# Patient Record
Sex: FEMALE | Race: WHITE | HISPANIC OR LATINO | Employment: OTHER | URBAN - METROPOLITAN AREA
[De-identification: names, ages, dates, MRNs, and addresses within clinical notes are randomized per-mention and may not be internally consistent; named-entity substitution may affect disease eponyms.]

---

## 2023-04-18 DIAGNOSIS — E03.9 ACQUIRED HYPOTHYROIDISM: Primary | ICD-10-CM

## 2023-04-18 RX ORDER — LEVOTHYROXINE SODIUM 100 UG/1
100 TABLET ORAL DAILY
Qty: 90 TABLET | Refills: 0 | Status: SHIPPED | OUTPATIENT
Start: 2023-04-18 | End: 2023-06-28 | Stop reason: SDUPTHER

## 2023-04-18 RX ORDER — LEVOTHYROXINE SODIUM 100 UG/1
100 TABLET ORAL DAILY
COMMUNITY
Start: 2015-10-27 | End: 2023-04-18 | Stop reason: SDUPTHER

## 2023-06-14 ENCOUNTER — APPOINTMENT (OUTPATIENT)
Dept: PRIMARY CARE | Facility: CLINIC | Age: 68
End: 2023-06-14
Payer: MEDICARE

## 2023-06-19 PROBLEM — E03.9 ACQUIRED HYPOTHYROIDISM: Status: ACTIVE | Noted: 2023-06-19

## 2023-06-19 PROBLEM — M75.20 BICEPS TENDINITIS: Status: ACTIVE | Noted: 2023-06-19

## 2023-06-19 PROBLEM — R21 RASH: Status: ACTIVE | Noted: 2023-06-19

## 2023-06-19 PROBLEM — Z86.010 HISTORY OF COLON POLYPS: Status: ACTIVE | Noted: 2023-06-19

## 2023-06-19 PROBLEM — N20.0 CALCULUS OF KIDNEY: Status: ACTIVE | Noted: 2023-06-19

## 2023-06-19 PROBLEM — E78.5 HYPERLIPIDEMIA: Status: ACTIVE | Noted: 2023-06-19

## 2023-06-19 PROBLEM — E66.9 OBESITY (BMI 30-39.9): Status: ACTIVE | Noted: 2023-06-19

## 2023-06-19 PROBLEM — M47.812 CERVICAL OSTEOARTHRITIS: Status: ACTIVE | Noted: 2023-06-19

## 2023-06-19 PROBLEM — Z86.0100 HISTORY OF COLON POLYPS: Status: ACTIVE | Noted: 2023-06-19

## 2023-06-19 PROBLEM — R10.9 RIGHT FLANK PAIN: Status: ACTIVE | Noted: 2023-06-19

## 2023-06-19 PROBLEM — K80.20 ASYMPTOMATIC GALLSTONES: Status: ACTIVE | Noted: 2023-06-19

## 2023-06-19 PROBLEM — M47.816 DEGENERATIVE ARTHRITIS OF LUMBAR SPINE: Status: ACTIVE | Noted: 2023-06-19

## 2023-06-19 PROBLEM — M54.2 NECK PAIN: Status: ACTIVE | Noted: 2023-06-19

## 2023-06-19 PROBLEM — I10 HTN (HYPERTENSION), BENIGN: Status: ACTIVE | Noted: 2023-06-19

## 2023-06-19 PROBLEM — S46.819A TRAPEZIUS STRAIN: Status: ACTIVE | Noted: 2023-06-19

## 2023-06-19 PROBLEM — M54.9 BACK PAIN: Status: ACTIVE | Noted: 2023-06-19

## 2023-06-19 RX ORDER — HYDROXYZINE HYDROCHLORIDE 25 MG/1
TABLET, FILM COATED ORAL
COMMUNITY
Start: 2020-08-12 | End: 2023-06-21

## 2023-06-19 RX ORDER — FLUOCINONIDE 0.5 MG/G
CREAM TOPICAL 2 TIMES DAILY
COMMUNITY
Start: 2020-08-12 | End: 2023-06-21

## 2023-06-19 RX ORDER — LISINOPRIL 5 MG/1
1 TABLET ORAL DAILY
COMMUNITY
Start: 2017-03-14 | End: 2023-09-07 | Stop reason: SDUPTHER

## 2023-06-21 ENCOUNTER — OFFICE VISIT (OUTPATIENT)
Dept: PRIMARY CARE | Facility: CLINIC | Age: 68
End: 2023-06-21
Payer: MEDICARE

## 2023-06-21 VITALS
HEART RATE: 76 BPM | TEMPERATURE: 97.2 F | SYSTOLIC BLOOD PRESSURE: 128 MMHG | OXYGEN SATURATION: 100 % | DIASTOLIC BLOOD PRESSURE: 78 MMHG | RESPIRATION RATE: 18 BRPM | BODY MASS INDEX: 30.36 KG/M2 | WEIGHT: 166 LBS

## 2023-06-21 DIAGNOSIS — Z00.00 MEDICARE ANNUAL WELLNESS VISIT, SUBSEQUENT: Primary | ICD-10-CM

## 2023-06-21 DIAGNOSIS — Z00.00 ANNUAL PHYSICAL EXAM: ICD-10-CM

## 2023-06-21 DIAGNOSIS — Z12.31 ENCOUNTER FOR SCREENING MAMMOGRAM FOR BREAST CANCER: ICD-10-CM

## 2023-06-21 DIAGNOSIS — M25.551 PAIN OF RIGHT HIP: ICD-10-CM

## 2023-06-21 DIAGNOSIS — Z13.31 DEPRESSION SCREEN: ICD-10-CM

## 2023-06-21 DIAGNOSIS — I10 HTN (HYPERTENSION), BENIGN: ICD-10-CM

## 2023-06-21 DIAGNOSIS — E03.9 ACQUIRED HYPOTHYROIDISM: ICD-10-CM

## 2023-06-21 DIAGNOSIS — Z71.89 ADVANCE DIRECTIVE DISCUSSED WITH PATIENT: ICD-10-CM

## 2023-06-21 DIAGNOSIS — I10 BENIGN ESSENTIAL HTN: ICD-10-CM

## 2023-06-21 DIAGNOSIS — Z13.39 ALCOHOL SCREENING: ICD-10-CM

## 2023-06-21 PROCEDURE — 1170F FXNL STATUS ASSESSED: CPT | Performed by: INTERNAL MEDICINE

## 2023-06-21 PROCEDURE — 99214 OFFICE O/P EST MOD 30 MIN: CPT | Performed by: INTERNAL MEDICINE

## 2023-06-21 PROCEDURE — 1159F MED LIST DOCD IN RCRD: CPT | Performed by: INTERNAL MEDICINE

## 2023-06-21 PROCEDURE — 99397 PER PM REEVAL EST PAT 65+ YR: CPT | Performed by: INTERNAL MEDICINE

## 2023-06-21 PROCEDURE — G0444 DEPRESSION SCREEN ANNUAL: HCPCS | Performed by: INTERNAL MEDICINE

## 2023-06-21 PROCEDURE — G0439 PPPS, SUBSEQ VISIT: HCPCS | Performed by: INTERNAL MEDICINE

## 2023-06-21 PROCEDURE — G0446 INTENS BEHAVE THER CARDIO DX: HCPCS | Performed by: INTERNAL MEDICINE

## 2023-06-21 PROCEDURE — 1036F TOBACCO NON-USER: CPT | Performed by: INTERNAL MEDICINE

## 2023-06-21 PROCEDURE — 3074F SYST BP LT 130 MM HG: CPT | Performed by: INTERNAL MEDICINE

## 2023-06-21 PROCEDURE — 99497 ADVNCD CARE PLAN 30 MIN: CPT | Performed by: INTERNAL MEDICINE

## 2023-06-21 PROCEDURE — G0442 ANNUAL ALCOHOL SCREEN 15 MIN: HCPCS | Performed by: INTERNAL MEDICINE

## 2023-06-21 PROCEDURE — 3078F DIAST BP <80 MM HG: CPT | Performed by: INTERNAL MEDICINE

## 2023-06-21 ASSESSMENT — PATIENT HEALTH QUESTIONNAIRE - PHQ9
SUM OF ALL RESPONSES TO PHQ9 QUESTIONS 1 AND 2: 0
1. LITTLE INTEREST OR PLEASURE IN DOING THINGS: NOT AT ALL
2. FEELING DOWN, DEPRESSED OR HOPELESS: NOT AT ALL

## 2023-06-21 ASSESSMENT — PAIN SCALES - GENERAL: PAINLEVEL: 2

## 2023-06-21 ASSESSMENT — ACTIVITIES OF DAILY LIVING (ADL)
GROCERY_SHOPPING: INDEPENDENT
TAKING_MEDICATION: INDEPENDENT
DOING_HOUSEWORK: INDEPENDENT
BATHING: INDEPENDENT
MANAGING_FINANCES: INDEPENDENT
DRESSING: INDEPENDENT

## 2023-06-21 NOTE — PROGRESS NOTES
My nurse note reviewed. Patient is here for:  6 month FUV (Patient stated she feels pain on right side down to leg started 2 days ago.) and Medicare Annual Wellness Visit Subsequent       Here for medicare wellness, f/U and physical     Pain in R hip for a week . No fall or injry . Goes  on lat aspect of hip . More on walking .     Patient denies any shortness of breath, PND, orthopnea, chest pain , palpitation, syncope or edema in legs  Abdomen: soft, non tender. No organomegaly noted. BS +. No CVA tenderness noted.  Patient denies any weakness in extremities.. Denies any headache, visual symptoms , speech problems or  tremors . No TIA or stroke like symptoms..    Taking meds ok     During Medicare wellness apart from looking at assessment done by nurse,  I also asked following :    Alcohol use : Alcohol screening  was  done during this visit. Patient is not having any issue with increase alcohol use . No ETOH abuse was observed by history .occ wine drinking     HIV test: patient was not found to be high risk for HIV     Cognitive issue : None     Advanced Directive: Patient has advanced directive including living will and Health care power of . Health POAILYN is daughter , jorge . All questions related to it answered. Total time > 16 min.     I have reviewed all active medications patient is currently on . Questions related to medication answered to patient's satisfaction.      REVIEW OF SYSTEMS:   All other systems have been reviewed and are negative in relation to patient's complaint and other than what is mentioned in History of present illness.      During Medicare wellness patients chronic diagnosis were reviewed and questions related to it answered to patient's satisfaction . Risk factors for heart, stroke were discussed with patient . Discussion about preventative tests were done with patient also . pain issue was discussed as appropriate for patient .  ASCVD score 9.7 %    OBJECTIVE :    /78  (BP Location: Left arm, Patient Position: Sitting)   Pulse 76   Temp 36.2 °C (97.2 °F)   Resp 18   Wt 75.3 kg (166 lb)   SpO2 100%   BMI 30.36 kg/m²   Vitals noted  Not in acute distress  Conj Pink, No icterus  ears exam normal  Neck:No Cervical LN enlargement, No Thyroid enlargement No carotid bruit  Lungs: good air entry bilaterally, no rales or rhonchi  Breast examination: Breasts are large, symmetric. No dominant or discrete suspicious masses noted in breast area.  No nipple changes or discharge noted.  CVS: S1 S2 + , no S3. No loud heart murmur heard.   Abdomen: Soft, non tender , BS +. no organomegaly , no CVA tenderness  MSK: No spine tenderness or muscle tenderness noted on gross examination  CNS: Pt is alert, moving all 4 extremities. no motor weakness or abnormal movements noted on gross examination.  Extremities: No edema, No calf tenderness, Yohan's sign negative. DTR + knee and wrists, Rhomberg's neg      Assessment:    1. Medicare annual wellness visit, subsequent - done.    2. HTN (hypertension), benign    3. Annual physical exam -done. Tests ordered   4. Alcohol screening    5. Depression screen    6. Advance directive discussed with patient    7. Pain of right hip -xray hip   8. Encounter for screening mammogram for breast cancer    HTN - controlled with med. Continue it  Hypothyroid- on med, check TSH      Plan:   Medicare wellness done  Physical exam done. Tests ordered  Xray R hip  Blood tests  Mammogram   Current medications are effective. advised to continue current medications.  Tylenol 2 tablet twice day   F/U 6 months or as needed

## 2023-06-21 NOTE — PATIENT INSTRUCTIONS
Plan:   Medicare wellness done  Physical exam done. Tests ordered  Xray R hip  Blood tests  Mammogram   Current medications are effective. advised to continue current medications.  Tylenol 2 tablet twice day   F/U 6 months or as needed

## 2023-06-22 ENCOUNTER — LAB (OUTPATIENT)
Dept: LAB | Facility: LAB | Age: 68
End: 2023-06-22
Payer: MEDICARE

## 2023-06-22 DIAGNOSIS — I10 HTN (HYPERTENSION), BENIGN: ICD-10-CM

## 2023-06-22 LAB
ALANINE AMINOTRANSFERASE (SGPT) (U/L) IN SER/PLAS: 18 U/L (ref 7–45)
ALBUMIN (G/DL) IN SER/PLAS: 4.1 G/DL (ref 3.4–5)
ALKALINE PHOSPHATASE (U/L) IN SER/PLAS: 94 U/L (ref 33–136)
ANION GAP IN SER/PLAS: 12 MMOL/L (ref 10–20)
ASPARTATE AMINOTRANSFERASE (SGOT) (U/L) IN SER/PLAS: 24 U/L (ref 9–39)
BASOPHILS (10*3/UL) IN BLOOD BY AUTOMATED COUNT: 0.04 X10E9/L (ref 0–0.1)
BASOPHILS/100 LEUKOCYTES IN BLOOD BY AUTOMATED COUNT: 0.6 % (ref 0–2)
BILIRUBIN DIRECT (MG/DL) IN SER/PLAS: 0.1 MG/DL (ref 0–0.3)
BILIRUBIN TOTAL (MG/DL) IN SER/PLAS: 0.6 MG/DL (ref 0–1.2)
CALCIUM (MG/DL) IN SER/PLAS: 9.3 MG/DL (ref 8.6–10.3)
CARBON DIOXIDE, TOTAL (MMOL/L) IN SER/PLAS: 28 MMOL/L (ref 21–32)
CHLORIDE (MMOL/L) IN SER/PLAS: 104 MMOL/L (ref 98–107)
CHOLESTEROL (MG/DL) IN SER/PLAS: 247 MG/DL (ref 0–199)
CHOLESTEROL IN HDL (MG/DL) IN SER/PLAS: 65 MG/DL
CHOLESTEROL/HDL RATIO: 3.8
CREATININE (MG/DL) IN SER/PLAS: 0.7 MG/DL (ref 0.5–1.05)
EOSINOPHILS (10*3/UL) IN BLOOD BY AUTOMATED COUNT: 0.09 X10E9/L (ref 0–0.7)
EOSINOPHILS/100 LEUKOCYTES IN BLOOD BY AUTOMATED COUNT: 1.4 % (ref 0–6)
ERYTHROCYTE DISTRIBUTION WIDTH (RATIO) BY AUTOMATED COUNT: 12.7 % (ref 11.5–14.5)
ERYTHROCYTE MEAN CORPUSCULAR HEMOGLOBIN CONCENTRATION (G/DL) BY AUTOMATED: 31.2 G/DL (ref 32–36)
ERYTHROCYTE MEAN CORPUSCULAR VOLUME (FL) BY AUTOMATED COUNT: 94 FL (ref 80–100)
ERYTHROCYTES (10*6/UL) IN BLOOD BY AUTOMATED COUNT: 4.38 X10E12/L (ref 4–5.2)
GFR FEMALE: >90 ML/MIN/1.73M2
GLUCOSE (MG/DL) IN SER/PLAS: 82 MG/DL (ref 74–99)
HEMATOCRIT (%) IN BLOOD BY AUTOMATED COUNT: 41.3 % (ref 36–46)
HEMOGLOBIN (G/DL) IN BLOOD: 12.9 G/DL (ref 12–16)
IMMATURE GRANULOCYTES/100 LEUKOCYTES IN BLOOD BY AUTOMATED COUNT: 0.2 % (ref 0–0.9)
LEUKOCYTES (10*3/UL) IN BLOOD BY AUTOMATED COUNT: 6.2 X10E9/L (ref 4.4–11.3)
LYMPHOCYTES (10*3/UL) IN BLOOD BY AUTOMATED COUNT: 2.58 X10E9/L (ref 1.2–4.8)
LYMPHOCYTES/100 LEUKOCYTES IN BLOOD BY AUTOMATED COUNT: 41.5 % (ref 13–44)
MONOCYTES (10*3/UL) IN BLOOD BY AUTOMATED COUNT: 0.55 X10E9/L (ref 0.1–1)
MONOCYTES/100 LEUKOCYTES IN BLOOD BY AUTOMATED COUNT: 8.9 % (ref 2–10)
NEUTROPHILS (10*3/UL) IN BLOOD BY AUTOMATED COUNT: 2.94 X10E9/L (ref 1.2–7.7)
NEUTROPHILS/100 LEUKOCYTES IN BLOOD BY AUTOMATED COUNT: 47.4 % (ref 40–80)
NON-HDL CHOLESTEROL: 182 MG/DL
NRBC (PER 100 WBCS) BY AUTOMATED COUNT: 0 /100 WBC (ref 0–0)
PLATELETS (10*3/UL) IN BLOOD AUTOMATED COUNT: 384 X10E9/L (ref 150–450)
POTASSIUM (MMOL/L) IN SER/PLAS: 4.4 MMOL/L (ref 3.5–5.3)
PROTEIN TOTAL: 7.4 G/DL (ref 6.4–8.2)
SODIUM (MMOL/L) IN SER/PLAS: 140 MMOL/L (ref 136–145)
THYROTROPIN (MIU/L) IN SER/PLAS BY DETECTION LIMIT <= 0.05 MIU/L: 0.01 MIU/L (ref 0.44–3.98)
UREA NITROGEN (MG/DL) IN SER/PLAS: 12 MG/DL (ref 6–23)

## 2023-06-22 PROCEDURE — 80048 BASIC METABOLIC PNL TOTAL CA: CPT

## 2023-06-22 PROCEDURE — 83718 ASSAY OF LIPOPROTEIN: CPT

## 2023-06-22 PROCEDURE — 84443 ASSAY THYROID STIM HORMONE: CPT

## 2023-06-22 PROCEDURE — 36415 COLL VENOUS BLD VENIPUNCTURE: CPT

## 2023-06-22 PROCEDURE — 85025 COMPLETE CBC W/AUTO DIFF WBC: CPT

## 2023-06-22 PROCEDURE — 80076 HEPATIC FUNCTION PANEL: CPT

## 2023-06-22 PROCEDURE — 82465 ASSAY BLD/SERUM CHOLESTEROL: CPT

## 2023-06-28 ENCOUNTER — TELEMEDICINE (OUTPATIENT)
Dept: PRIMARY CARE | Facility: CLINIC | Age: 68
End: 2023-06-28
Payer: MEDICARE

## 2023-06-28 DIAGNOSIS — R92.8 ABNORMAL MAMMOGRAM: ICD-10-CM

## 2023-06-28 DIAGNOSIS — E78.5 HYPERLIPIDEMIA, UNSPECIFIED HYPERLIPIDEMIA TYPE: ICD-10-CM

## 2023-06-28 DIAGNOSIS — E03.9 ACQUIRED HYPOTHYROIDISM: Primary | ICD-10-CM

## 2023-06-28 PROCEDURE — 99442 PR PHYS/QHP TELEPHONE EVALUATION 11-20 MIN: CPT | Performed by: INTERNAL MEDICINE

## 2023-06-28 RX ORDER — LEVOTHYROXINE SODIUM 50 UG/1
50 TABLET ORAL DAILY
Qty: 30 TABLET | Refills: 11 | Status: SHIPPED | OUTPATIENT
Start: 2023-06-28 | End: 2024-06-03 | Stop reason: SDUPTHER

## 2023-06-28 NOTE — PROGRESS NOTES
" \"This visit was completed via Telephone call due to the restrictions of the COVID-19 pandemic and/or other reason . All issues as below were discussed and addressed but no physical exam was performed. If it was felt that the patient should be evaluated in clinic then they were directed there. The patient verbally consented to visit.\"    Talked and evaluated  patient over Telephone call .    Pt had mammogram, hip xray and blood tests    Recent lab results were discussed with patient. Questions related to it answered. Patient felt satisfied with it.  Discussed about arthritis in hip , lower back issue.   Discussed abn mamm, diagnostic view and US ordered  Discussed very low TSH . She is on 100 mcg of thyroid    1. Acquired hypothyroidism  levothyroxine (Synthroid, Levoxyl) 50 mcg tablet      2. Abnormal mammogram  BI mammo left diagnostic    BI US breast complete left      3. Hyperlipidemia, unspecified hyperlipidemia type          Plan   Low mike diet discussed  Decrease thyroid to 50 mcg a day from 100 mcg a day   Diagnostic mammogram and US for L breast ordered  I spent more than 12 minutes of time with patient . All questions were answered to patients satisfaction . Patient felt satisfied with it.    "

## 2023-08-14 ENCOUNTER — TELEMEDICINE (OUTPATIENT)
Dept: PRIMARY CARE | Facility: CLINIC | Age: 68
End: 2023-08-14
Payer: COMMERCIAL

## 2023-08-14 DIAGNOSIS — R92.8 ABNORMAL MAMMOGRAM: Primary | ICD-10-CM

## 2023-08-14 PROCEDURE — 99442 PR PHYS/QHP TELEPHONE EVALUATION 11-20 MIN: CPT | Performed by: INTERNAL MEDICINE

## 2023-08-14 NOTE — PROGRESS NOTES
" \"This visit was completed via Telephone call due to the restrictions of the COVID-19 pandemic and/or other reason . All issues as below were discussed and addressed but no physical exam was performed. If it was felt that the patient should be evaluated in clinic then they were directed there. The patient verbally consented to visit.\"    Talked and evaluated  patient over Telephone call .    Pt had abn mammogram and ultrasound, most likely benign LN vs other etiology .   Questions answered about it.   No FH of breast ca.     Objective:  Patient did not seem to be in any distress over phone conversation .   Physical examination was not done due to telephone appointment .      Assessment:  Abn mammogram  Plan :  US and L mammogram in 6 months   I spent more than 12 minutes of time with patient . All questions were answered to patients satisfaction . Patient felt satisfied with it.  Patient felt satisfied with plan    " No

## 2023-09-07 DIAGNOSIS — I10 HTN (HYPERTENSION), BENIGN: Primary | ICD-10-CM

## 2023-09-07 RX ORDER — LISINOPRIL 5 MG/1
5 TABLET ORAL DAILY
Qty: 90 TABLET | Refills: 3 | Status: SHIPPED | OUTPATIENT
Start: 2023-09-07

## 2023-12-21 ENCOUNTER — OFFICE VISIT (OUTPATIENT)
Dept: PRIMARY CARE | Facility: CLINIC | Age: 68
End: 2023-12-21
Payer: COMMERCIAL

## 2023-12-21 VITALS
TEMPERATURE: 95.7 F | OXYGEN SATURATION: 100 % | HEIGHT: 62 IN | BODY MASS INDEX: 29.63 KG/M2 | WEIGHT: 161 LBS | SYSTOLIC BLOOD PRESSURE: 138 MMHG | DIASTOLIC BLOOD PRESSURE: 74 MMHG | HEART RATE: 62 BPM

## 2023-12-21 DIAGNOSIS — E03.9 ACQUIRED HYPOTHYROIDISM: ICD-10-CM

## 2023-12-21 DIAGNOSIS — I10 HTN (HYPERTENSION), BENIGN: Primary | ICD-10-CM

## 2023-12-21 DIAGNOSIS — E78.5 HYPERLIPIDEMIA, UNSPECIFIED HYPERLIPIDEMIA TYPE: ICD-10-CM

## 2023-12-21 PROCEDURE — 1036F TOBACCO NON-USER: CPT | Performed by: INTERNAL MEDICINE

## 2023-12-21 PROCEDURE — 99214 OFFICE O/P EST MOD 30 MIN: CPT | Performed by: INTERNAL MEDICINE

## 2023-12-21 PROCEDURE — 1125F AMNT PAIN NOTED PAIN PRSNT: CPT | Performed by: INTERNAL MEDICINE

## 2023-12-21 PROCEDURE — 1159F MED LIST DOCD IN RCRD: CPT | Performed by: INTERNAL MEDICINE

## 2023-12-21 PROCEDURE — 3078F DIAST BP <80 MM HG: CPT | Performed by: INTERNAL MEDICINE

## 2023-12-21 PROCEDURE — 3075F SYST BP GE 130 - 139MM HG: CPT | Performed by: INTERNAL MEDICINE

## 2023-12-21 ASSESSMENT — PATIENT HEALTH QUESTIONNAIRE - PHQ9
2. FEELING DOWN, DEPRESSED OR HOPELESS: NOT AT ALL
SUM OF ALL RESPONSES TO PHQ9 QUESTIONS 1 AND 2: 0
2. FEELING DOWN, DEPRESSED OR HOPELESS: NOT AT ALL
1. LITTLE INTEREST OR PLEASURE IN DOING THINGS: NOT AT ALL
1. LITTLE INTEREST OR PLEASURE IN DOING THINGS: NOT AT ALL
SUM OF ALL RESPONSES TO PHQ9 QUESTIONS 1 AND 2: 0

## 2023-12-21 NOTE — PROGRESS NOTES
"Here with   My nurse note reviewed. Patient is here for:  Follow-up       Taking meds ok   Patient denies any shortness of breath, PND, orthopnea, chest pain , palpitation, syncope or edema in legs  Patient denies any weakness in extremities.. Denies any headache, visual symptoms , speech problems or  tremors . No TIA or stroke like symptoms..    OBJECTIVE :  /74   Pulse 62   Temp 35.4 °C (95.7 °F)   Ht 1.575 m (5' 2\")   Wt 73 kg (161 lb)   SpO2 100%   BMI 29.45 kg/m²   Lost 5 lbs from last visit   CVS: S1 S2 + , no S3. No loud heart murmur appreciated. Lungs clear, No edema  CNS: Patient is alert, oriented moving all 4 extremities well. No motor weakness noted on gross neurological exam. No involuntary movements or tremors noted.    Assessment:  1. HTN (hypertension), benign -controlled     2. Acquired hypothyroidism -hx of. On med.    3. Hyperlipidemia, unspecified hyperlipidemia type -hx of     Plan  Discussed about overweight and complications related to it. Discussed about weight reduction and regular exercise to decrease weight. Questions related to it answered to patient's satisfaction.    Current medications are effective. advised to continue current medications.  She brought records of covid and flu shot given to her at drug store, reviewed.   I have reviewed all active medications patient is currently on . Questions related to medication answered to patient's satisfaction.  F/U in June for medicare wellness     "

## 2024-04-16 NOTE — PROGRESS NOTES
HPI  68 y.o. female presenting for follow up of right back pain.      She is s/p bilateral URS, stone extraction, stent placement 8/13/21. Stents were removed on 8/18/21.     R kidney stones were 100% Ca Ox  L kidney stones were 70% Ca Ox, 30% Ca Phos     RBUS, 9/27/21 - no stones, no hydro, bilateral ureteral jets seen.     10/4/21 - Her flank pain is completely resolved. She is happy. Encouraged hydration, reduced salt, reduced animal proteins.     RBUS 3/2022 - no stones, mild fullness on the left     4/4/22 - doing very well, no stone pain. the pain she had originally is gone. now with some R sided back pain exacerbated by movement.     RBUS 4/2023 - small nonobstructing R stone, no hydro bilaterally     04/10/23 - She states the pain is better. No symptoms from R kidney stone. Wants to observe. Pt denies infection or urinary issues.     4/17/24 - seen today for 1 year fuv to review recent RBUS. RBUS not completed. Reports some recent back pain with activity, otherwise doing well. Denies infections, gross hematuria, dysuria.    Current Medications:  Current Outpatient Medications   Medication Sig Dispense Refill    levothyroxine (Synthroid, Levoxyl) 50 mcg tablet Take 1 tablet (50 mcg) by mouth once daily. 30 tablet 11    lisinopril 5 mg tablet Take 1 tablet (5 mg) by mouth once daily. 90 tablet 3     No current facility-administered medications for this visit.        Active Problems:  Shelby Novak is a 68 y.o. female with the following Problems and Medications.  Patient Active Problem List   Diagnosis    Acquired hypothyroidism    Asymptomatic gallstones    Back pain    Biceps tendinitis    Calculus of kidney    Cervical osteoarthritis    Degenerative arthritis of lumbar spine    History of colon polyps    HTN (hypertension), benign    Hyperlipidemia    Neck pain    Obesity (BMI 30-39.9)    Rash    Right flank pain    Trapezius strain     Current Outpatient Medications   Medication Sig Dispense Refill     levothyroxine (Synthroid, Levoxyl) 50 mcg tablet Take 1 tablet (50 mcg) by mouth once daily. 30 tablet 11    lisinopril 5 mg tablet Take 1 tablet (5 mg) by mouth once daily. 90 tablet 3     No current facility-administered medications for this visit.       PMH:  Past Medical History:   Diagnosis Date    Other specified health status     No pertinent past medical history       PSH:  Past Surgical History:   Procedure Laterality Date    OTHER SURGICAL HISTORY  06/07/2021    Renal lithotripsy       FMH:  No family history on file.    SHx:  Social History     Tobacco Use    Smoking status: Never    Smokeless tobacco: Never   Substance Use Topics    Alcohol use: Yes     Alcohol/week: 1.0 standard drink of alcohol     Types: 1 Glasses of wine per week     Comment: socially       Allergies:  No Known Allergies    Physical Exam:      Assessment/Plan  Patient complains of some recent back pain that I suspect could be musculoskeletal. Did not obtain RBUS prior to visit today, will get now. Follow up in 1 month via thv with RBUS prior.      Scribe Attestation  By signing my name below, I, Ketan Roldan, attest that this documentation  has been prepared under the direction and in the presence of Garrett Wong MD.

## 2024-04-17 ENCOUNTER — OFFICE VISIT (OUTPATIENT)
Dept: UROLOGY | Facility: HOSPITAL | Age: 69
End: 2024-04-17
Payer: MEDICARE

## 2024-04-17 DIAGNOSIS — G89.29 CHRONIC BACK PAIN, UNSPECIFIED BACK LOCATION, UNSPECIFIED BACK PAIN LATERALITY: Primary | ICD-10-CM

## 2024-04-17 DIAGNOSIS — M54.9 CHRONIC BACK PAIN, UNSPECIFIED BACK LOCATION, UNSPECIFIED BACK PAIN LATERALITY: Primary | ICD-10-CM

## 2024-04-17 DIAGNOSIS — N20.0 CALCULUS OF KIDNEY: ICD-10-CM

## 2024-04-17 PROCEDURE — 99213 OFFICE O/P EST LOW 20 MIN: CPT | Performed by: UROLOGY

## 2024-05-08 ENCOUNTER — HOSPITAL ENCOUNTER (OUTPATIENT)
Dept: RADIOLOGY | Facility: HOSPITAL | Age: 69
Discharge: HOME | End: 2024-05-08
Payer: MEDICARE

## 2024-05-08 DIAGNOSIS — N20.0 CALCULUS OF KIDNEY: ICD-10-CM

## 2024-05-08 PROCEDURE — 76770 US EXAM ABDO BACK WALL COMP: CPT | Performed by: STUDENT IN AN ORGANIZED HEALTH CARE EDUCATION/TRAINING PROGRAM

## 2024-05-08 PROCEDURE — 76770 US EXAM ABDO BACK WALL COMP: CPT

## 2024-05-17 ENCOUNTER — TELEMEDICINE (OUTPATIENT)
Dept: UROLOGY | Facility: HOSPITAL | Age: 69
End: 2024-05-17
Payer: MEDICARE

## 2024-05-17 DIAGNOSIS — N20.0 CALCULUS OF KIDNEY: Primary | ICD-10-CM

## 2024-05-17 PROCEDURE — 99213 OFFICE O/P EST LOW 20 MIN: CPT | Performed by: UROLOGY

## 2024-05-17 NOTE — PROGRESS NOTES
HPI    68 y.o. female being seen with the following problem list:    Problem list:  Stones    She is s/p bilateral URS, stone extraction, stent placement 8/13/21. Stents were removed on 8/18/21.     R kidney stones were 100% Ca Ox  L kidney stones were 70% Ca Ox, 30% Ca Phos     RBUS, 9/27/21 - no stones, no hydro, bilateral ureteral jets seen.     10/4/21 - Her flank pain is completely resolved. She is happy. Encouraged hydration, reduced salt, reduced animal proteins.     RBUS 3/2022 - no stones, mild fullness on the left     4/4/22 - doing very well, no stone pain. the pain she had originally is gone. now with some R sided back pain exacerbated by movement.     RBUS 4/2023 - small nonobstructing R stone, no hydro bilaterally     04/10/23 - She states the pain is better. No symptoms from R kidney stone. Wants to observe. Pt denies infection or urinary issues.      4/17/24 - seen today for 1 year fuv to review recent RBUS. RBUS not completed. Reports some recent back pain with activity, otherwise doing well. Denies infections, gross hematuria, dysuria.    RBUS 5/8/24 -7 mm right interpolar region calculus, with mild hydronephrosis.  Mild left hydronephrosis without visualized obstructing calculus.    05/17/24 - seen back with RBUS prior. Doing well.       Current Medications:  Current Outpatient Medications   Medication Sig Dispense Refill    levothyroxine (Synthroid, Levoxyl) 50 mcg tablet Take 1 tablet (50 mcg) by mouth once daily. 30 tablet 11    lisinopril 5 mg tablet Take 1 tablet (5 mg) by mouth once daily. 90 tablet 3     No current facility-administered medications for this visit.        Active Problems:  Shelby Novak is a 68 y.o. female with the following Problems and Medications.  Patient Active Problem List   Diagnosis    Acquired hypothyroidism    Asymptomatic gallstones    Back pain    Biceps tendinitis    Calculus of kidney    Cervical osteoarthritis    Degenerative arthritis of lumbar spine     History of colon polyps    HTN (hypertension), benign    Hyperlipidemia    Neck pain    Obesity (BMI 30-39.9)    Rash    Right flank pain    Trapezius strain     Current Outpatient Medications   Medication Sig Dispense Refill    levothyroxine (Synthroid, Levoxyl) 50 mcg tablet Take 1 tablet (50 mcg) by mouth once daily. 30 tablet 11    lisinopril 5 mg tablet Take 1 tablet (5 mg) by mouth once daily. 90 tablet 3     No current facility-administered medications for this visit.       PMH:  Past Medical History:   Diagnosis Date    Other specified health status     No pertinent past medical history       PSH:  Past Surgical History:   Procedure Laterality Date    OTHER SURGICAL HISTORY  06/07/2021    Renal lithotripsy       FMH:  No family history on file.    SHx:  Social History     Tobacco Use    Smoking status: Never    Smokeless tobacco: Never   Substance Use Topics    Alcohol use: Yes     Alcohol/week: 1.0 standard drink of alcohol     Types: 1 Glasses of wine per week     Comment: socially       Allergies:  No Known Allergies    Assessment/Plan  We discussed her recent US showing a stone on the R. She is asymptomatic. Discussed observation vs intervention. She would like to just keep an eye on it for now.    Fu 1 year with CHAITANYA PADILLA Attestation  By signing my name below, I, Ketan Roldan, attest that this documentation  has been prepared under the direction and in the presence of Garrett Wong MD.

## 2024-06-03 DIAGNOSIS — E03.9 ACQUIRED HYPOTHYROIDISM: ICD-10-CM

## 2024-06-05 RX ORDER — LEVOTHYROXINE SODIUM 50 UG/1
50 TABLET ORAL DAILY
Qty: 30 TABLET | Refills: 11 | Status: SHIPPED | OUTPATIENT
Start: 2024-06-05 | End: 2025-06-05

## 2024-06-12 ENCOUNTER — APPOINTMENT (OUTPATIENT)
Dept: PRIMARY CARE | Facility: CLINIC | Age: 69
End: 2024-06-12
Payer: MEDICARE

## 2024-06-12 VITALS
TEMPERATURE: 97.3 F | SYSTOLIC BLOOD PRESSURE: 118 MMHG | OXYGEN SATURATION: 98 % | DIASTOLIC BLOOD PRESSURE: 80 MMHG | HEART RATE: 88 BPM | WEIGHT: 163 LBS | BODY MASS INDEX: 29.81 KG/M2

## 2024-06-12 DIAGNOSIS — Z13.31 DEPRESSION SCREEN: ICD-10-CM

## 2024-06-12 DIAGNOSIS — Z86.010 HISTORY OF COLON POLYPS: ICD-10-CM

## 2024-06-12 DIAGNOSIS — N20.0 CALCULUS OF KIDNEY: ICD-10-CM

## 2024-06-12 DIAGNOSIS — Z71.89 ADVANCE DIRECTIVE DISCUSSED WITH PATIENT: ICD-10-CM

## 2024-06-12 DIAGNOSIS — I10 HTN (HYPERTENSION), BENIGN: ICD-10-CM

## 2024-06-12 DIAGNOSIS — Z00.00 MEDICARE ANNUAL WELLNESS VISIT, SUBSEQUENT: Primary | ICD-10-CM

## 2024-06-12 DIAGNOSIS — E03.9 ACQUIRED HYPOTHYROIDISM: ICD-10-CM

## 2024-06-12 DIAGNOSIS — Z78.0 POSTMENOPAUSAL: ICD-10-CM

## 2024-06-12 DIAGNOSIS — E78.5 HYPERLIPIDEMIA, UNSPECIFIED HYPERLIPIDEMIA TYPE: ICD-10-CM

## 2024-06-12 PROCEDURE — 3079F DIAST BP 80-89 MM HG: CPT | Performed by: INTERNAL MEDICINE

## 2024-06-12 PROCEDURE — G0442 ANNUAL ALCOHOL SCREEN 15 MIN: HCPCS | Performed by: INTERNAL MEDICINE

## 2024-06-12 PROCEDURE — 1159F MED LIST DOCD IN RCRD: CPT | Performed by: INTERNAL MEDICINE

## 2024-06-12 PROCEDURE — G0444 DEPRESSION SCREEN ANNUAL: HCPCS | Performed by: INTERNAL MEDICINE

## 2024-06-12 PROCEDURE — 99497 ADVNCD CARE PLAN 30 MIN: CPT | Performed by: INTERNAL MEDICINE

## 2024-06-12 PROCEDURE — G0439 PPPS, SUBSEQ VISIT: HCPCS | Performed by: INTERNAL MEDICINE

## 2024-06-12 PROCEDURE — 1170F FXNL STATUS ASSESSED: CPT | Performed by: INTERNAL MEDICINE

## 2024-06-12 PROCEDURE — 3074F SYST BP LT 130 MM HG: CPT | Performed by: INTERNAL MEDICINE

## 2024-06-12 PROCEDURE — 99213 OFFICE O/P EST LOW 20 MIN: CPT | Performed by: INTERNAL MEDICINE

## 2024-06-12 PROCEDURE — 1036F TOBACCO NON-USER: CPT | Performed by: INTERNAL MEDICINE

## 2024-06-12 ASSESSMENT — PATIENT HEALTH QUESTIONNAIRE - PHQ9
2. FEELING DOWN, DEPRESSED OR HOPELESS: NOT AT ALL
SUM OF ALL RESPONSES TO PHQ9 QUESTIONS 1 AND 2: 0
1. LITTLE INTEREST OR PLEASURE IN DOING THINGS: NOT AT ALL

## 2024-06-12 ASSESSMENT — ACTIVITIES OF DAILY LIVING (ADL)
MANAGING_FINANCES: INDEPENDENT
GROCERY_SHOPPING: INDEPENDENT
BATHING: INDEPENDENT
TAKING_MEDICATION: INDEPENDENT
DOING_HOUSEWORK: INDEPENDENT
DRESSING: INDEPENDENT

## 2024-06-12 NOTE — PROGRESS NOTES
My nurse note reviewed. Patient is here for:  Here for medicare wellness, follow up   Here with     Patient denies any shortness of breath, PND, orthopnea, chest pain , palpitation, syncope or edema in legs  patient denies any abdominal pain, tenderness, nausea, vomiting, change in bowel habits or blood in stool.  Patient denies any weakness in extremities.. Denies any headache, visual symptoms , speech problems or  tremors . No TIA or stroke like symptoms..    Taking meds ok   Over the past 2 weeks, how often have you been bothered by any of the following problems?  Little interest or pleasure in doing things: Not at all  Feeling down, depressed, or hopeless: Not at all  Nutrition and Exercise  Current Diet: Well Balanced Diet  Adequate Fluid Intake: Yes  Caffeine: Yes  IADL's  Grocery Shopping: Independent  Doing Housework: Independent  Taking Medication: Independent  Managing Finances: Independent  [place] Home Safety Risk Factors  Home Safety Risk Factors: None   During Medicare wellness apart from looking at assessment done by nurse,  I also asked following :    Alcohol use : Alcohol screening  was  done during this visit. Patient is not having any issue with increase alcohol use . No ETOH abuse was observed by history .    HIV test: patient was not found to be high risk for HIV     Cognitive issue : None     Advanced Directive: Patient has advanced directive including living will and Health care power of . Health POA is  . All questions related to it answered. Total time > 16 min.     I have reviewed all active medications patient is currently on . Questions related to medication answered to patient's satisfaction.  REVIEW OF SYSTEMS:   All other systems have been reviewed and are negative in relation to patient's complaint and other than what is mentioned in History of present illness.  OBJECTIVE :  /80   Pulse 88   Temp 36.3 °C (97.3 °F)   Wt 73.9 kg (163 lb)   SpO2 98%   BMI  29.81 kg/m²     Vitals noted   Not in acute distress  Conj Pink, No icterus  Neck:No Cervical LN enlargement, No Thyroid enlargement No carotid bruit  Lungs: good air entry bilaterally, no rales or rhonchi  CVS: S1 S2 + , no S3. No loud heart murmur heard.   Breast examination: Breasts are symmetric. No dominant or discrete suspicious masses noted in breast area.  No nipple changes or discharge noted.  Has inverted nipple on L side  Abdomen: Soft, non tender , BS +. no organomegaly , no CVA tenderness  MSK: No spine tenderness or muscle tenderness noted on gross examination  CNS: Pt is alert, moving all 4 extremities. no motor weakness or abnormal movements noted on gross examination.  Extremities: No edema, No calf tenderness, Yohan's sign negative.  Assessment:  1. Medicare annual wellness visit, subsequent  Done.       2. Advance directive discussed with patient        3. Depression screen  Done.               5. HTN (hypertension), benign  controlled        6. Acquired hypothyroidism  Hx of. On med      7. Hyperlipidemia, unspecified hyperlipidemia type  CBC and Auto Differential    Basic Metabolic Panel    Hepatic Function Panel    Lipid Panel Non-Fasting    Thyroid Stimulating Hormone      8. History of colon polyps  Upto date on colonoscopy       9. Calculus of kidney  Hx of. No current issues. Drinks lot of fluids      10. Postmenopausal  XR DEXA bone density        Plan  Medicare wellness done.   Blood tests ordered  Bone density test .   Discussed about overweight and complications related to it. Discussed about weight reduction and regular exercise to decrease weight. Questions related to it answered to patient's satisfaction.  Current medications are effective. advised to continue current medications.  F/U 6 months and in 12 months for medicare wellness         p

## 2024-06-12 NOTE — PATIENT INSTRUCTIONS
Plan  Medicare wellness done.   Blood tests ordered  Bone density test .   Discussed about overweight and complications related to it. Discussed about weight reduction and regular exercise to decrease weight. Questions related to it answered to patient's satisfaction.  Current medications are effective. advised to continue current medications.  F/U 6 months and in 12 months for medicare wellness

## 2024-06-13 ENCOUNTER — APPOINTMENT (OUTPATIENT)
Dept: PRIMARY CARE | Facility: CLINIC | Age: 69
End: 2024-06-13
Payer: MEDICARE

## 2024-06-13 ENCOUNTER — LAB (OUTPATIENT)
Dept: LAB | Facility: LAB | Age: 69
End: 2024-06-13
Payer: MEDICARE

## 2024-06-13 DIAGNOSIS — E78.5 HYPERLIPIDEMIA, UNSPECIFIED HYPERLIPIDEMIA TYPE: ICD-10-CM

## 2024-06-13 LAB
ALBUMIN SERPL BCP-MCNC: 4.2 G/DL (ref 3.4–5)
ALP SERPL-CCNC: 71 U/L (ref 33–136)
ALT SERPL W P-5'-P-CCNC: 17 U/L (ref 7–45)
ANION GAP SERPL CALC-SCNC: 10 MMOL/L (ref 10–20)
AST SERPL W P-5'-P-CCNC: 29 U/L (ref 9–39)
BASOPHILS # BLD AUTO: 0.04 X10*3/UL (ref 0–0.1)
BASOPHILS NFR BLD AUTO: 0.6 %
BILIRUB DIRECT SERPL-MCNC: 0.1 MG/DL (ref 0–0.3)
BILIRUB SERPL-MCNC: 0.5 MG/DL (ref 0–1.2)
BUN SERPL-MCNC: 13 MG/DL (ref 6–23)
CALCIUM SERPL-MCNC: 9.2 MG/DL (ref 8.6–10.3)
CHLORIDE SERPL-SCNC: 106 MMOL/L (ref 98–107)
CHOLEST SERPL-MCNC: 294 MG/DL (ref 0–199)
CHOLESTEROL/HDL RATIO: 4.7
CO2 SERPL-SCNC: 28 MMOL/L (ref 21–32)
CREAT SERPL-MCNC: 0.79 MG/DL (ref 0.5–1.05)
EGFRCR SERPLBLD CKD-EPI 2021: 82 ML/MIN/1.73M*2
EOSINOPHIL # BLD AUTO: 0.07 X10*3/UL (ref 0–0.7)
EOSINOPHIL NFR BLD AUTO: 1 %
ERYTHROCYTE [DISTWIDTH] IN BLOOD BY AUTOMATED COUNT: 13.2 % (ref 11.5–14.5)
GLUCOSE SERPL-MCNC: 89 MG/DL (ref 74–99)
HCT VFR BLD AUTO: 41.3 % (ref 36–46)
HDLC SERPL-MCNC: 62.5 MG/DL
HGB BLD-MCNC: 12.7 G/DL (ref 12–16)
IMM GRANULOCYTES # BLD AUTO: 0.01 X10*3/UL (ref 0–0.7)
IMM GRANULOCYTES NFR BLD AUTO: 0.1 % (ref 0–0.9)
LYMPHOCYTES # BLD AUTO: 2.27 X10*3/UL (ref 1.2–4.8)
LYMPHOCYTES NFR BLD AUTO: 32.9 %
MCH RBC QN AUTO: 30.9 PG (ref 26–34)
MCHC RBC AUTO-ENTMCNC: 30.8 G/DL (ref 32–36)
MCV RBC AUTO: 101 FL (ref 80–100)
MONOCYTES # BLD AUTO: 0.64 X10*3/UL (ref 0.1–1)
MONOCYTES NFR BLD AUTO: 9.3 %
NEUTROPHILS # BLD AUTO: 3.88 X10*3/UL (ref 1.2–7.7)
NEUTROPHILS NFR BLD AUTO: 56.1 %
NON-HDL CHOLESTEROL: 232 MG/DL (ref 0–149)
NRBC BLD-RTO: 0 /100 WBCS (ref 0–0)
PLATELET # BLD AUTO: 382 X10*3/UL (ref 150–450)
POTASSIUM SERPL-SCNC: 4.2 MMOL/L (ref 3.5–5.3)
PROT SERPL-MCNC: 7.4 G/DL (ref 6.4–8.2)
RBC # BLD AUTO: 4.11 X10*6/UL (ref 4–5.2)
SODIUM SERPL-SCNC: 140 MMOL/L (ref 136–145)
TSH SERPL-ACNC: 30.61 MIU/L (ref 0.44–3.98)
WBC # BLD AUTO: 6.9 X10*3/UL (ref 4.4–11.3)

## 2024-06-13 PROCEDURE — 80053 COMPREHEN METABOLIC PANEL: CPT

## 2024-06-13 PROCEDURE — 36415 COLL VENOUS BLD VENIPUNCTURE: CPT

## 2024-06-13 PROCEDURE — 82248 BILIRUBIN DIRECT: CPT

## 2024-06-13 PROCEDURE — 82465 ASSAY BLD/SERUM CHOLESTEROL: CPT

## 2024-06-13 PROCEDURE — 84443 ASSAY THYROID STIM HORMONE: CPT

## 2024-06-13 PROCEDURE — 83718 ASSAY OF LIPOPROTEIN: CPT

## 2024-06-13 PROCEDURE — 85025 COMPLETE CBC W/AUTO DIFF WBC: CPT

## 2024-06-18 ENCOUNTER — HOSPITAL ENCOUNTER (OUTPATIENT)
Dept: RADIOLOGY | Facility: CLINIC | Age: 69
Discharge: HOME | End: 2024-06-18
Payer: MEDICARE

## 2024-06-18 DIAGNOSIS — Z78.0 POSTMENOPAUSAL: ICD-10-CM

## 2024-06-18 PROCEDURE — 77080 DXA BONE DENSITY AXIAL: CPT

## 2024-06-20 ENCOUNTER — TELEPHONE (OUTPATIENT)
Dept: PRIMARY CARE | Facility: CLINIC | Age: 69
End: 2024-06-20
Payer: MEDICARE

## 2024-06-20 NOTE — TELEPHONE ENCOUNTER
----- Message from Alexi Cochran MD sent at 6/18/2024  4:33 PM EDT -----  Slight weakness in bones without definite osteoporosis noted on recent bone density test. You should take Calcium 600 mg with Vit D 400 units one tablet twice daily for your bones. It is over the counter. Let me know if any questions. Please notify patient. Thanks.

## 2024-06-25 ENCOUNTER — TELEMEDICINE (OUTPATIENT)
Dept: PRIMARY CARE | Facility: CLINIC | Age: 69
End: 2024-06-25
Payer: MEDICARE

## 2024-06-25 DIAGNOSIS — E78.5 HYPERLIPIDEMIA, UNSPECIFIED HYPERLIPIDEMIA TYPE: Primary | ICD-10-CM

## 2024-06-25 DIAGNOSIS — E03.9 ACQUIRED HYPOTHYROIDISM: ICD-10-CM

## 2024-06-25 PROCEDURE — 99442 PR PHYS/QHP TELEPHONE EVALUATION 11-20 MIN: CPT | Performed by: INTERNAL MEDICINE

## 2024-06-25 RX ORDER — LEVOTHYROXINE SODIUM 100 UG/1
100 TABLET ORAL DAILY
Qty: 90 TABLET | Refills: 3 | Status: SHIPPED | OUTPATIENT
Start: 2024-06-25 | End: 2025-06-25

## 2024-06-25 RX ORDER — ATORVASTATIN CALCIUM 40 MG/1
40 TABLET, FILM COATED ORAL DAILY
Qty: 100 TABLET | Refills: 3 | Status: SHIPPED | OUTPATIENT
Start: 2024-06-25 | End: 2025-07-30

## 2024-06-25 NOTE — PROGRESS NOTES
" \"This visit was completed via Telephone call due to the restrictions of the COVID-19 pandemic and/or other reason . All issues as below were discussed and addressed but no physical exam was performed. If it was felt that the patient should be evaluated in clinic then they were directed there. The patient verbally consented to visit.\"    Talked and evaluated  patient over Telephone call .    Lab Results   Component Value Date    CHOL 294 (H) 06/13/2024    CHOL 247 (H) 06/22/2023    CHOL 249 (H) 06/22/2022     Lab Results   Component Value Date    HDL 62.5 06/13/2024    HDL 65.0 06/22/2023    HDL 62.5 06/22/2022     No results found for: \"LDLCALC\"  Lab Results   Component Value Date    TRIG 173 (H) 03/04/2019    TRIG 118 06/13/2018     No components found for: \"CHOLHDL\"  Lab Results   Component Value Date    TSH 30.61 (H) 06/13/2024     Recent lab results were discussed with patient. Questions related to it answered. Patient felt satisfied with it.  ASCVD score close to 10 %. Discussed about mike. She agreed to start med for mike.     She is taking thyroid med regularly as per her     Assessment:  1. Hyperlipidemia, unspecified hyperlipidemia type -new med started. Questions answered   2. Acquired hypothyroidism -dose increased      Plan  Increase thyroid dose to 100 mcg a day from 50 mcg a day   Started on lipitor 40 mg a day   Will see her back in Sept . Will repeat blood tests at that time.   I spent more than 12 minutes of time with patient . All questions were answered to patients satisfaction . Patient felt satisfied with it.  Patient felt satisfied with plan    "

## 2024-12-03 ENCOUNTER — LAB (OUTPATIENT)
Dept: LAB | Facility: LAB | Age: 69
End: 2024-12-03
Payer: MEDICARE

## 2024-12-03 ENCOUNTER — APPOINTMENT (OUTPATIENT)
Dept: PRIMARY CARE | Facility: CLINIC | Age: 69
End: 2024-12-03
Payer: MEDICARE

## 2024-12-03 VITALS
WEIGHT: 161 LBS | OXYGEN SATURATION: 100 % | TEMPERATURE: 96.7 F | BODY MASS INDEX: 29.45 KG/M2 | HEART RATE: 66 BPM | SYSTOLIC BLOOD PRESSURE: 126 MMHG | DIASTOLIC BLOOD PRESSURE: 88 MMHG

## 2024-12-03 DIAGNOSIS — E03.9 ACQUIRED HYPOTHYROIDISM: ICD-10-CM

## 2024-12-03 DIAGNOSIS — E78.5 HYPERLIPIDEMIA, UNSPECIFIED HYPERLIPIDEMIA TYPE: ICD-10-CM

## 2024-12-03 DIAGNOSIS — I10 HTN (HYPERTENSION), BENIGN: Primary | ICD-10-CM

## 2024-12-03 LAB
CHOLEST SERPL-MCNC: 242 MG/DL (ref 0–199)
CHOLESTEROL/HDL RATIO: 3.9
HDLC SERPL-MCNC: 61.3 MG/DL
NON-HDL CHOLESTEROL: 181 MG/DL (ref 0–149)
TSH SERPL-ACNC: 0.01 MIU/L (ref 0.44–3.98)

## 2024-12-03 PROCEDURE — 99214 OFFICE O/P EST MOD 30 MIN: CPT | Performed by: INTERNAL MEDICINE

## 2024-12-03 PROCEDURE — G2211 COMPLEX E/M VISIT ADD ON: HCPCS | Performed by: INTERNAL MEDICINE

## 2024-12-03 PROCEDURE — 3074F SYST BP LT 130 MM HG: CPT | Performed by: INTERNAL MEDICINE

## 2024-12-03 PROCEDURE — 1036F TOBACCO NON-USER: CPT | Performed by: INTERNAL MEDICINE

## 2024-12-03 PROCEDURE — 3079F DIAST BP 80-89 MM HG: CPT | Performed by: INTERNAL MEDICINE

## 2024-12-03 ASSESSMENT — PATIENT HEALTH QUESTIONNAIRE - PHQ9
1. LITTLE INTEREST OR PLEASURE IN DOING THINGS: NOT AT ALL
2. FEELING DOWN, DEPRESSED OR HOPELESS: NOT AT ALL
SUM OF ALL RESPONSES TO PHQ9 QUESTIONS 1 AND 2: 0

## 2024-12-03 NOTE — PROGRESS NOTES
"My nurse note reviewed. Patient is here for:  Follow up     Here with    Hx of HTN, HLD, thyroid  Taking meds ok   Patient denies any shortness of breath, PND, orthopnea, chest pain , palpitation, syncope or edema in legs  Patient denies any weakness in extremities.. Denies any headache, visual symptoms , speech problems or  tremors . No TIA or stroke like symptoms..    OBJECTIVE :  /88   Pulse 66   Temp 35.9 °C (96.7 °F)   Wt 73 kg (161 lb)   SpO2 100%   BMI 29.45 kg/m²   Overweight  CVS: S1 S2 + , no S3. No loud heart murmur appreciated. Lungs clear, No edema  CNS: Patient is alert, oriented moving all 4 extremities well. No motor weakness noted on gross neurological exam. No involuntary movements or tremors noted.    During office visit today patient's chronic diagnosis were reviewed and questions related to it answered to patient's satisfaction . Risk factors for heart, stroke were discussed with patient . Discussion about preventative tests were done with patient also . pain issue was discussed as appropriate for patient . I plan to follow up patient's chronic medical conditions as appropriate.     Assessment:  1. HTN (hypertension), benign -controlled  With med. Continue it   2. Hyperlipidemia, unspecified hyperlipidemia type -on med.. was high . Will repeat it  Lab Results   Component Value Date    CHOL 294 (H) 06/13/2024    CHOL 247 (H) 06/22/2023    CHOL 249 (H) 06/22/2022     Lab Results   Component Value Date    HDL 62.5 06/13/2024    HDL 65.0 06/22/2023    HDL 62.5 06/22/2022     No results found for: \"LDLCALC\"  Lab Results   Component Value Date    TRIG 173 (H) 03/04/2019    TRIG 118 06/13/2018     No components found for: \"CHOLHDL\"       3. Acquired hypothyroidism -on med.  Lab Results   Component Value Date    TSH 30.61 (H) 06/13/2024          Plan  Current medications are effective. advised to continue current medications.  Lipid test ordered.   Weight reduction .   F/U 6 months as " scheduled for medicare wellness.

## 2024-12-04 DIAGNOSIS — E03.9 ACQUIRED HYPOTHYROIDISM: ICD-10-CM

## 2024-12-04 RX ORDER — LEVOTHYROXINE SODIUM 88 UG/1
88 TABLET ORAL DAILY
Qty: 30 TABLET | Refills: 11 | Status: SHIPPED | OUTPATIENT
Start: 2024-12-04 | End: 2025-12-04

## 2024-12-13 ENCOUNTER — TELEPHONE (OUTPATIENT)
Dept: PRIMARY CARE | Facility: CLINIC | Age: 69
End: 2024-12-13
Payer: MEDICARE

## 2024-12-13 NOTE — TELEPHONE ENCOUNTER
Notified        ----- Message from Alexi Cochran sent at 12/4/2024  4:14 PM EST -----  Thyroid blood test was overactive.   Plan  Decrease thyroid dose from 100 mcg to 88 mcg. New rx sent to pharmacy   Go for repeat thryoid blood test in 3 months.   Please notify patient. Thanks.

## 2025-05-17 ENCOUNTER — HOSPITAL ENCOUNTER (OUTPATIENT)
Dept: RADIOLOGY | Facility: HOSPITAL | Age: 70
Discharge: HOME | End: 2025-05-17
Payer: MEDICARE

## 2025-05-17 DIAGNOSIS — N20.0 CALCULUS OF KIDNEY: ICD-10-CM

## 2025-05-17 PROCEDURE — 76770 US EXAM ABDO BACK WALL COMP: CPT

## 2025-05-17 PROCEDURE — 76770 US EXAM ABDO BACK WALL COMP: CPT | Performed by: STUDENT IN AN ORGANIZED HEALTH CARE EDUCATION/TRAINING PROGRAM

## 2025-05-21 ENCOUNTER — APPOINTMENT (OUTPATIENT)
Dept: UROLOGY | Facility: HOSPITAL | Age: 70
End: 2025-05-21
Payer: MEDICARE

## 2025-05-21 NOTE — PROGRESS NOTES
HPI    Virtual or Telephone Consent    While technically available, the patient was unable or unwilling to consent to connect via audio/video telehealth technology; therefore, I performed this visit using a real-time audio only connection between Shelby Novak & Garrett Wong MD.  Verbal consent was requested and obtained from Shelby Novak on this date, 05/22/25 for a telehealth visit and the patient's location was confirmed at the time of the visit.    69 y.o. female being seen with the following problem list:    Problem list:  Stones     She is s/p bilateral URS, stone extraction, stent placement 8/13/21. Stents were removed on 8/18/21.     R kidney stones were 100% Ca Ox  L kidney stones were 70% Ca Ox, 30% Ca Phos     RBUS, 9/27/21 - no stones, no hydro, bilateral ureteral jets seen.     10/4/21 - Her flank pain is completely resolved. She is happy. Encouraged hydration, reduced salt, reduced animal proteins.     RBUS 3/2022 - no stones, mild fullness on the left     4/4/22 - doing very well, no stone pain. the pain she had originally is gone. now with some R sided back pain exacerbated by movement.     RBUS 4/2023 - small nonobstructing R stone, no hydro bilaterally     04/10/23 - She states the pain is better. No symptoms from R kidney stone. Wants to observe. Pt denies infection or urinary issues.      4/17/24 - seen today for 1 year fuv to review recent RBUS. RBUS not completed. Reports some recent back pain with activity, otherwise doing well. Denies infections, gross hematuria, dysuria.     RBUS 5/8/24 -7 mm right interpolar region calculus, with mild hydronephrosis.  Mild left hydronephrosis without visualized obstructing calculus.     05/17/24 - seen back with RBUS prior. Doing well.     Renal US Results  IMPRESSION:  1. Right midpole nonobstructive nephrolithiasis measuring 0.7 cm     05/21/25 - mild intermittent right-sided flank pain. Inclined to manage her stone conservatively if  possible    Current Medications:  Current Medications[1]     Active Problems:  Shelby Novak is a 69 y.o. female with the following Problems and Medications.  Problem List[2]  Current Medications[3]    PMH:  Medical History[4]    PSH:  Surgical History[5]    FMH:  Family History[6]    SHx:  Social History[7]    Allergies:  RX Allergies[8]      Assessment/Plan  The patient has intermittent mild right-sided flank pain. US shows a slowly growing R sided stone. The patient would like to defer treatment at this time. Patient will manage with fluids. Order renal US for 1 year and follow-up with results. The patient will call sooner if symptoms become worse.         Scribe Attestation  By signing my name below, I, Anabel Brown , Ketan   attest that this documentation has been prepared under the direction and in the presence of Garrett Wong MD.         [1]   Current Outpatient Medications   Medication Sig Dispense Refill    atorvastatin (Lipitor) 40 mg tablet Take 1 tablet (40 mg) by mouth once daily. 100 tablet 3    levothyroxine (Synthroid, Levoxyl) 88 mcg tablet Take 1 tablet (88 mcg) by mouth early in the morning.. Take on an empty stomach at the same time each day, either 30 to 60 minutes prior to breakfast 30 tablet 11    lisinopril 5 mg tablet Take 1 tablet (5 mg) by mouth once daily. 90 tablet 3     No current facility-administered medications for this visit.   [2]   Patient Active Problem List  Diagnosis    Acquired hypothyroidism    Asymptomatic gallstones    Back pain    Biceps tendinitis    Calculus of kidney    Cervical osteoarthritis    Degenerative arthritis of lumbar spine    History of colon polyps    HTN (hypertension), benign    Hyperlipidemia    Neck pain    Obesity (BMI 30-39.9)    Rash    Right flank pain    Trapezius strain   [3]   Current Outpatient Medications   Medication Sig Dispense Refill    atorvastatin (Lipitor) 40 mg tablet Take 1 tablet (40 mg) by mouth once daily. 100 tablet 3     levothyroxine (Synthroid, Levoxyl) 88 mcg tablet Take 1 tablet (88 mcg) by mouth early in the morning.. Take on an empty stomach at the same time each day, either 30 to 60 minutes prior to breakfast 30 tablet 11    lisinopril 5 mg tablet Take 1 tablet (5 mg) by mouth once daily. 90 tablet 3     No current facility-administered medications for this visit.   [4]   Past Medical History:  Diagnosis Date    Other specified health status     No pertinent past medical history   [5]   Past Surgical History:  Procedure Laterality Date    OTHER SURGICAL HISTORY  06/07/2021    Renal lithotripsy   [6] No family history on file.  [7]   Social History  Tobacco Use    Smoking status: Never    Smokeless tobacco: Never   Substance Use Topics    Alcohol use: Yes     Alcohol/week: 1.0 standard drink of alcohol     Types: 1 Glasses of wine per week     Comment: socially   [8] No Known Allergies

## 2025-05-22 ENCOUNTER — TELEMEDICINE (OUTPATIENT)
Dept: UROLOGY | Facility: HOSPITAL | Age: 70
End: 2025-05-22
Payer: MEDICARE

## 2025-05-22 DIAGNOSIS — R10.9 RIGHT FLANK PAIN: ICD-10-CM

## 2025-05-22 DIAGNOSIS — N20.0 CALCULUS OF KIDNEY: Primary | ICD-10-CM

## 2025-05-22 PROCEDURE — 99214 OFFICE O/P EST MOD 30 MIN: CPT | Performed by: UROLOGY

## 2025-05-22 PROCEDURE — G2211 COMPLEX E/M VISIT ADD ON: HCPCS | Performed by: UROLOGY

## 2025-06-11 ENCOUNTER — APPOINTMENT (OUTPATIENT)
Dept: PRIMARY CARE | Facility: CLINIC | Age: 70
End: 2025-06-11
Payer: MEDICARE

## 2025-06-11 VITALS
TEMPERATURE: 97.2 F | SYSTOLIC BLOOD PRESSURE: 133 MMHG | OXYGEN SATURATION: 97 % | DIASTOLIC BLOOD PRESSURE: 83 MMHG | WEIGHT: 162.8 LBS | HEIGHT: 62 IN | BODY MASS INDEX: 29.96 KG/M2 | HEART RATE: 76 BPM

## 2025-06-11 DIAGNOSIS — I10 HTN (HYPERTENSION), BENIGN: ICD-10-CM

## 2025-06-11 DIAGNOSIS — Z00.00 ANNUAL PHYSICAL EXAM: ICD-10-CM

## 2025-06-11 DIAGNOSIS — E78.5 HYPERLIPIDEMIA, UNSPECIFIED HYPERLIPIDEMIA TYPE: ICD-10-CM

## 2025-06-11 DIAGNOSIS — Z13.31 DEPRESSION SCREEN: ICD-10-CM

## 2025-06-11 DIAGNOSIS — Z12.31 ENCOUNTER FOR SCREENING MAMMOGRAM FOR BREAST CANCER: ICD-10-CM

## 2025-06-11 DIAGNOSIS — Z71.89 ADVANCE DIRECTIVE DISCUSSED WITH PATIENT: ICD-10-CM

## 2025-06-11 DIAGNOSIS — Z00.00 MEDICARE ANNUAL WELLNESS VISIT, SUBSEQUENT: Primary | ICD-10-CM

## 2025-06-11 DIAGNOSIS — Z13.39 ALCOHOL SCREENING: ICD-10-CM

## 2025-06-11 DIAGNOSIS — E03.9 ACQUIRED HYPOTHYROIDISM: ICD-10-CM

## 2025-06-11 DIAGNOSIS — Z13.6 SCREENING FOR CARDIOVASCULAR CONDITION: ICD-10-CM

## 2025-06-11 LAB
ALBUMIN SERPL-MCNC: 4.1 G/DL (ref 3.6–5.1)
ALBUMIN/GLOB SERPL: 1.2 (CALC) (ref 1–2.5)
ALP SERPL-CCNC: 85 U/L (ref 37–153)
ALT SERPL-CCNC: 14 U/L (ref 6–29)
ANION GAP SERPL CALCULATED.4IONS-SCNC: 9 MMOL/L (CALC) (ref 7–17)
AST SERPL-CCNC: 22 U/L (ref 10–35)
BASOPHILS # BLD AUTO: 48 CELLS/UL (ref 0–200)
BASOPHILS NFR BLD AUTO: 0.8 %
BILIRUB DIRECT SERPL-MCNC: 0.1 MG/DL
BILIRUB INDIRECT SERPL-MCNC: 0.3 MG/DL (CALC) (ref 0.2–1.2)
BILIRUB SERPL-MCNC: 0.4 MG/DL (ref 0.2–1.2)
BUN SERPL-MCNC: 14 MG/DL (ref 7–25)
BUN/CREAT SERPL: NORMAL (CALC) (ref 6–22)
CALCIUM SERPL-MCNC: 9.2 MG/DL (ref 8.6–10.4)
CHLORIDE SERPL-SCNC: 104 MMOL/L (ref 98–110)
CHOLEST SERPL-MCNC: 271 MG/DL
CHOLEST/HDLC SERPL: 4 (CALC)
CO2 SERPL-SCNC: 26 MMOL/L (ref 20–32)
CREAT SERPL-MCNC: 0.73 MG/DL (ref 0.5–1.05)
EGFRCR SERPLBLD CKD-EPI 2021: 89 ML/MIN/1.73M2
EOSINOPHIL # BLD AUTO: 78 CELLS/UL (ref 15–500)
EOSINOPHIL NFR BLD AUTO: 1.3 %
ERYTHROCYTE [DISTWIDTH] IN BLOOD BY AUTOMATED COUNT: 12.7 % (ref 11–15)
GLOBULIN SER CALC-MCNC: 3.4 G/DL (CALC) (ref 1.9–3.7)
GLUCOSE SERPL-MCNC: 77 MG/DL (ref 65–99)
HCT VFR BLD AUTO: 40.6 % (ref 35–45)
HDLC SERPL-MCNC: 68 MG/DL
HGB BLD-MCNC: 13.1 G/DL (ref 11.7–15.5)
LDLC SERPL CALC-MCNC: 175 MG/DL (CALC)
LYMPHOCYTES # BLD AUTO: 2034 CELLS/UL (ref 850–3900)
LYMPHOCYTES NFR BLD AUTO: 33.9 %
MCH RBC QN AUTO: 31 PG (ref 27–33)
MCHC RBC AUTO-ENTMCNC: 32.3 G/DL (ref 32–36)
MCV RBC AUTO: 96 FL (ref 80–100)
MONOCYTES # BLD AUTO: 522 CELLS/UL (ref 200–950)
MONOCYTES NFR BLD AUTO: 8.7 %
NEUTROPHILS # BLD AUTO: 3318 CELLS/UL (ref 1500–7800)
NEUTROPHILS NFR BLD AUTO: 55.3 %
NONHDLC SERPL-MCNC: 203 MG/DL (CALC)
PLATELET # BLD AUTO: 339 THOUSAND/UL (ref 140–400)
PMV BLD REES-ECKER: 9.1 FL (ref 7.5–12.5)
POTASSIUM SERPL-SCNC: 4.3 MMOL/L (ref 3.5–5.3)
PROT SERPL-MCNC: 7.5 G/DL (ref 6.1–8.1)
RBC # BLD AUTO: 4.23 MILLION/UL (ref 3.8–5.1)
SODIUM SERPL-SCNC: 139 MMOL/L (ref 135–146)
TRIGL SERPL-MCNC: 142 MG/DL
TSH SERPL-ACNC: 0.69 MIU/L (ref 0.4–4.5)
WBC # BLD AUTO: 6 THOUSAND/UL (ref 3.8–10.8)

## 2025-06-11 ASSESSMENT — ACTIVITIES OF DAILY LIVING (ADL)
TAKING_MEDICATION: INDEPENDENT
DRESSING: INDEPENDENT
MANAGING_FINANCES: INDEPENDENT
BATHING: INDEPENDENT
DOING_HOUSEWORK: INDEPENDENT
GROCERY_SHOPPING: INDEPENDENT

## 2025-06-11 ASSESSMENT — ENCOUNTER SYMPTOMS
DEPRESSION: 0
OCCASIONAL FEELINGS OF UNSTEADINESS: 0
LOSS OF SENSATION IN FEET: 0

## 2025-06-11 NOTE — PROGRESS NOTES
[facility] nurse note reviewed. Patient is here for:  Medicare Annual Wellness Visit Subsequent (Subsequent Medicare Wellness/)       Here for medicare wellness, physical and follow up  Here with     Patient denies any shortness of breath, PND, orthopnea, chest pain , palpitation, syncope or edema in legs  patient denies any abdominal pain, tenderness, nausea, vomiting, change in bowel habits or blood in stool.  Patient denies any weakness in extremities.. Denies any headache, visual symptoms , speech problems or  tremors . No TIA or stroke like symptoms..  patient denies any dysuria, frequency of urine or hematuria .  Hx of kidney stone in R kidney   Taking meds ok     Over the past 2 weeks, how often have you been bothered by any of the following problems?  Little interest or pleasure in doing things: Not at all  Feeling down, depressed, or hopeless: Not at all  Functional Ability/Level of Safety  Cognitive Impairment Observed: No cognitive impairment observed  Cognitive Impairment Reported: No cognitive impairment reported by patient or family  Nutrition and Exercise  Current Diet: Well Balanced Diet  Adequate Fluid Intake: Yes  Caffeine: Yes  Exercise Frequency: Infrequently  IADL's  Grocery Shopping: Independent  Doing Housework: Independent  Taking Medication: Independent  Managing Finances: Independent  [place] Home Safety Risk Factors  Home Safety Risk Factors: No grab bars, bathroom     During Medicare wellness apart from looking at assessment done by nurse,  I also asked following :    Alcohol use : Alcohol screening  was  done during this visit. Patient is not having any issue with increase alcohol use . No ETOH abuse was observed by history . Occ wine drinking    Non smoker.    Depression screen:  > 5 minutes  were spent screening for depression using PHQ2/PHQ9 as documented in the chart.    HIV test: patient was not found to be high risk for HIV     Cognitive issue : None     Advance directives:. Advance  "Care Planning discussed and documented in the medical record, patient did not wish or was not able to name a surrogate decision maker or provide an advance care plan. Patient has no living will. Patient has no healthcare POA. Total time was > 16 min    Additional Information: discussed about living will. Questions answered to patient's satisfaction.    I have reviewed all active medications patient is currently on . Questions related to medication answered to patient's satisfaction.    REVIEW OF SYSTEMS:   All other systems have been reviewed and are negative in relation to patient's complaint and other than what is mentioned in History of present illness.    OBJECTIVE :    /83   Pulse 76   Temp 36.2 °C (97.2 °F)   Ht 1.575 m (5' 2\")   Wt 73.8 kg (162 lb 12.8 oz)   SpO2 97%   BMI 29.78 kg/m²   Vitals noted  Not in acute distress  Conj Pink, No icterus  ears exam normal  Breast examination: Breasts are symmetric. No dominant or discrete suspicious masses noted in breast area.  No nipple changes or discharge noted. (Pt was offered Chaperone for exam but she declined ).  Neck:No Cervical LN enlargement, No Thyroid enlargement No carotid bruit  Lungs: good air entry bilaterally, no rales or rhonchi  CVS: S1 S2 + , no S3. No loud heart murmur heard.   Abdomen: Soft, non tender , BS +. no organomegaly , no CVA tenderness  MSK: No spine tenderness or muscle tenderness noted on gross examination  CNS: Pt is alert, moving all 4 extremities. no motor weakness or abnormal movements noted on gross examination.  Extremities: No edema, No calf tenderness, Yohan's sign negative. DTR + knee and wrists, Rhomberg's neg  During office visit today patient's chronic diagnosis were reviewed and questions related to it answered to patient's satisfaction . Risk factors for heart, stroke were discussed with patient . Discussion about preventative tests were done with patient also . pain issue was discussed as appropriate for " patient . I plan to follow up patient's chronic medical conditions as appropriate.   ASCVD score is 12.7 %     Assessment:  1. Medicare annual wellness visit, subsequent  Done.       2. Annual physical exam  Done. Tests ordered      3. Alcohol screening        4. Depression screen        5. Advance directive discussed with patient  done      6. Screening for cardiovascular condition  discussed      7. Acquired hypothyroidism  On med.check tsh      8. HTN (hypertension), benign  controlled with med. Continue it      9. Hyperlipidemia, unspecified hyperlipidemia type  CBC and Auto Differential    Basic Metabolic Panel    Hepatic Function Panel    Lipid Panel Non-Fasting    Thyroid Stimulating Hormone    CBC and Auto Differential    Basic Metabolic Panel    Hepatic Function Panel    Lipid Panel Non-Fasting    Thyroid Stimulating Hormone      10. Encounter for screening mammogram for breast cancer  BI mammo bilateral screening tomosynthesis        Plan  Medicare wellness done.   Annual physical done. Tests ordered  Blood tests   Mammogram   Current medications are effective. advised to continue current medications.  Continue exercise and follow diet to reduce weight   F/U in 6 months and in 12 months for medicare wellness.

## 2025-06-11 NOTE — PATIENT INSTRUCTIONS
Plan  Medicare wellness done.   Annual physical done. Tests ordered  Blood tests   Mammogram   Current medications are effective. advised to continue current medications.  Continue exercise and follow diet to reduce weight   F/U in 6 months and in 12 months for medicare wellness.

## 2025-06-12 DIAGNOSIS — E03.9 ACQUIRED HYPOTHYROIDISM: ICD-10-CM

## 2025-06-12 RX ORDER — ATORVASTATIN CALCIUM 80 MG/1
80 TABLET, FILM COATED ORAL DAILY
Qty: 100 TABLET | Refills: 3 | Status: SHIPPED | OUTPATIENT
Start: 2025-06-12 | End: 2026-07-17

## 2025-06-17 ENCOUNTER — TELEPHONE (OUTPATIENT)
Dept: PRIMARY CARE | Facility: CLINIC | Age: 70
End: 2025-06-17
Payer: MEDICARE

## 2025-06-18 ENCOUNTER — HOSPITAL ENCOUNTER (OUTPATIENT)
Dept: RADIOLOGY | Facility: CLINIC | Age: 70
Discharge: HOME | End: 2025-06-18
Payer: MEDICARE

## 2025-06-18 VITALS — BODY MASS INDEX: 29.81 KG/M2 | WEIGHT: 162 LBS | HEIGHT: 62 IN

## 2025-06-18 DIAGNOSIS — Z12.31 ENCOUNTER FOR SCREENING MAMMOGRAM FOR BREAST CANCER: ICD-10-CM

## 2025-06-18 PROCEDURE — 77067 SCR MAMMO BI INCL CAD: CPT

## 2025-06-18 PROCEDURE — 77063 BREAST TOMOSYNTHESIS BI: CPT | Performed by: RADIOLOGY

## 2025-06-18 PROCEDURE — 77067 SCR MAMMO BI INCL CAD: CPT | Performed by: RADIOLOGY

## 2025-06-24 ENCOUNTER — APPOINTMENT (OUTPATIENT)
Dept: PRIMARY CARE | Facility: CLINIC | Age: 70
End: 2025-06-24
Payer: MEDICARE

## 2025-06-24 DIAGNOSIS — R92.8 ABNORMAL MAMMOGRAM: Primary | ICD-10-CM

## 2025-06-24 PROCEDURE — 99212 OFFICE O/P EST SF 10 MIN: CPT | Performed by: INTERNAL MEDICINE

## 2025-06-24 NOTE — PROGRESS NOTES
"\"This visit was completed via telephone  All issues as below were discussed and addressed but no physical exam was performed. If it was felt that the patient should be evaluated in clinic then they were directed there. The patient verbally consented to visit.\"    Talked to patient  over phone .    patient recently had mammogram and it was abnormal.    Questions about it answered to patient. Differential diagnosis discussed. All questions answered    Question about Family history of breast cancer was also asked during conversation     OBJECTIVE :  Over phone patient didn't seem to be  in any acute distress . patient is alert, oriented, answering appropriately to questions asked.  Physical exam was not performed due to virtual/Telephone visit .    Assessment:  Abnormal Mammogram -US ordered    Plan:  US mammogram  ordered.   All questions related to it answered  I spent more than 12 minutes of time with patient and /or family. Greater than 50% of this time was spent in counseling and /or coordination of care.  patient agreed with plan and felt satisfied.    "

## 2025-06-27 ENCOUNTER — HOSPITAL ENCOUNTER (OUTPATIENT)
Dept: RADIOLOGY | Facility: CLINIC | Age: 70
Discharge: HOME | End: 2025-06-27
Payer: MEDICARE

## 2025-06-27 DIAGNOSIS — R92.8 ABNORMAL MAMMOGRAM: ICD-10-CM

## 2025-06-27 PROCEDURE — 76982 USE 1ST TARGET LESION: CPT | Mod: LT

## 2025-06-27 PROCEDURE — 76642 ULTRASOUND BREAST LIMITED: CPT | Mod: LT

## 2025-12-10 ENCOUNTER — APPOINTMENT (OUTPATIENT)
Dept: PRIMARY CARE | Facility: CLINIC | Age: 70
End: 2025-12-10
Payer: MEDICARE

## 2026-06-17 ENCOUNTER — APPOINTMENT (OUTPATIENT)
Dept: PRIMARY CARE | Facility: CLINIC | Age: 71
End: 2026-06-17
Payer: MEDICARE